# Patient Record
Sex: FEMALE | Race: OTHER | HISPANIC OR LATINO | URBAN - METROPOLITAN AREA
[De-identification: names, ages, dates, MRNs, and addresses within clinical notes are randomized per-mention and may not be internally consistent; named-entity substitution may affect disease eponyms.]

---

## 2019-06-08 ENCOUNTER — EMERGENCY (EMERGENCY)
Facility: HOSPITAL | Age: 34
LOS: 1 days | Discharge: HOME | End: 2019-06-08
Attending: EMERGENCY MEDICINE | Admitting: EMERGENCY MEDICINE
Payer: SELF-PAY

## 2019-06-08 VITALS
SYSTOLIC BLOOD PRESSURE: 113 MMHG | TEMPERATURE: 98 F | DIASTOLIC BLOOD PRESSURE: 72 MMHG | WEIGHT: 125 LBS | RESPIRATION RATE: 19 BRPM | HEIGHT: 65 IN | HEART RATE: 60 BPM | OXYGEN SATURATION: 100 %

## 2019-06-08 DIAGNOSIS — M79.89 OTHER SPECIFIED SOFT TISSUE DISORDERS: ICD-10-CM

## 2019-06-08 DIAGNOSIS — M79.671 PAIN IN RIGHT FOOT: ICD-10-CM

## 2019-06-08 PROCEDURE — 99283 EMERGENCY DEPT VISIT LOW MDM: CPT

## 2019-06-08 PROCEDURE — 99283 EMERGENCY DEPT VISIT LOW MDM: CPT | Mod: 25

## 2019-06-08 PROCEDURE — 73630 X-RAY EXAM OF FOOT: CPT | Mod: 26,RT

## 2019-06-08 PROCEDURE — 73630 X-RAY EXAM OF FOOT: CPT

## 2019-06-08 PROCEDURE — 99053 MED SERV 10PM-8AM 24 HR FAC: CPT

## 2019-06-08 NOTE — ED ADULT NURSE NOTE - OBJECTIVE STATEMENT
s/p trip and fall while wearing heels last night. bruising noted to top of right foot. no swelling noted to right lower extremity. denies any numbness/tingling to extremity.

## 2019-06-08 NOTE — ED ADULT NURSE NOTE - NS ED PATIENT SAFETY CONCERN
False Labor    If your pregnancy is at 37 weeks or longer and you are having contractions that are not true labor, you are having false labor. This means that it is not time yet to give birth to your baby.  True labor contractions can start unevenly but soon take on a regular pattern. As time goes on, the contractions will get stronger. Also, the intervals between the contractions will get shorter. Even at the onset, these contractions last at least 30 seconds and may increase to a minute. True labor contractions often start in the back and then move to the front.  False labor contractions can be strong, frequent, and painful, but there is no regular pattern. The intensity can vary from strong to mild to strong again. False labor contractions are most often felt in the front. While true labor contractions don’t stop no matter what you are doing, false labor contractions may stop on their own or when you rest or move around.  False labor contractions might make you feel anxious or lose sleep. However, they don’t mean that you are sick or that anything is wrong with your baby. You don’t need to take any medicine for false labor.  Sometimes, it may be too hard to tell false labor from true labor. In such cases, you may need to have a vaginal exam. This allows your healthcare provider to check for changes in the cervix that only occur with true labor.  Home care  · It may help to drink plenty of water and take warm baths. Do what you can ahead of time to prepare for giving birth so you’ll have less to worry about later.  · Keep a record of your contractions. Write down what time each one starts and how long it lasts. A stopwatch is helpful. Look for the pattern of regularly spaced out contractions with a gradual increase in the time each one lasts.  · Don’t be embarrassed about going to the hospital with a “false alarm.” Think of it as good practice for the real thing.    Follow-up care  Follow up with your healthcare  provider, or as advised. If you are very worried, confused, can’t eat or sleep, or have questions about your health or pregnancy, schedule an appointment with your provider.  When to seek medical advice  Call your healthcare provider right away if any of these occur:  · You are fewer than 37 weeks along in your pregnancy and you’re having contractions.  · You have contractions that are regular, getting longer, stronger, and closer together.  · Your water breaks.  · You have vaginal bleeding.  · You feel a decrease in your baby’s movement or any other unusual changes.   · You’re not sure if you are having false or true labor.     © 1439-4073 [employer]. [address] [place] [address], [place] [address]. All rights reserved. This information is not intended as a substitute for professional medical care. Always follow your healthcare professional's instructions.            No

## 2019-06-09 NOTE — ED PROVIDER NOTE - MUSCULOSKELETAL, MLM
swelling over lateral part of R foot, no other bony tenderness, no ankle pain, Spine appears normal, range of motion is not limited, no muscle or joint tenderness

## 2019-06-09 NOTE — ED PROVIDER NOTE - NSFOLLOWUPCLINICS_GEN_ALL_ED_FT
[facility]  Family Medicine  [facility]. [address]  [place], [place] [facility]  Phone: [phone]  Fax:   Follow Up Time:

## 2019-06-09 NOTE — ED PROVIDER NOTE - NSFOLLOWUPINSTRUCTIONS_ED_ALL_ED_FT
Musculoskeletal Pain  Musculoskeletal pain refers to aches and pains in your bones, joints, muscles, and the tissues that surround them. This pain can occur in any part of the body. It can last for a short time (acute) or a long time (chronic).    A physical exam, lab tests, and imaging studies may be done to find the cause of your musculoskeletal pain.    Follow these instructions at home:  Image   Lifestyle     Try to control or lower your stress levels. Stress increases muscle tension and can worsen musculoskeletal pain. It is important to recognize when you are anxious or stressed and learn ways to manage it. This may include:  Meditation or yoga.  Cognitive or behavioral therapy.  Acupuncture or massage therapy.  You may continue all activities unless the activities cause more pain. When the pain gets better, slowly resume your normal activities. Gradually increase the intensity and duration of your activities or exercise.  Managing pain, stiffness, and swelling     Take over-the-counter and prescription medicines only as told by your health care provider.  When your pain is severe, bed rest may be helpful. Lie or sit in any position that is comfortable, but get out of bed and walk around at least every couple of hours.  If directed, apply heat to the affected area as often as told by your health care provider. Use the heat source that your health care provider recommends, such as a moist heat pack or a heating pad.  Place a towel between your skin and the heat source.  Leave the heat on for 20–30 minutes.  Remove the heat if your skin turns bright red. This is especially important if you are unable to feel pain, heat, or cold. You may have a greater risk of getting burned.  If directed, put ice on the painful area.  Put ice in a plastic bag.  Place a towel between your skin and the bag.  Leave the ice on for 20 minutes, 2–3 times a day.  General instructions     Your health care provider may recommend that you see a physical therapist. This person can help you come up with a safe exercise program. Do any exercises as told by your physical therapist.  Keep all follow-up visits, including any physical therapy visits, as told by your health care providers. This is important.  Contact a health care provider if:  Your pain gets worse.  Medicines do not help ease your pain.  You cannot use the part of your body that hurts, such as your arm, leg, or neck.  You have trouble sleeping.  You have trouble doing your normal activities.  Get help right away if:  You have a new injury and your pain is worse or different.  You feel numb or you have tingling in the painful area.  Summary  Musculoskeletal pain refers to aches and pains in your bones, joints, muscles, and the tissues that surround them.  This pain can occur in any part of the body.  Your health care provider may recommend that you see a physical therapist. This person can help you come up with a safe exercise program. Do any exercises as told by your physical therapist.  Lower your stress level. Stress can worsen musculoskeletal pain. Ways to lower stress may include meditation, yoga, cognitive or behavioral therapy, acupuncture, and massage therapy.  This information is not intended to replace advice given to you by your health care provider. Make sure you discuss any questions you have with your health care provider.    Document Released: 12/18/2006 Document Revised: 01/17/2018 Document Reviewed: 01/17/2018  [employer] Patient Education © 2019 [employer]

## 2019-06-09 NOTE — ED PROVIDER NOTE - OBJECTIVE STATEMENT
Pt previously healthy with complaints of R foot pain after fall yesterday, tripped while wearing heels, no other injury, pain mainly to lateral part of R foot, no diff ambulating, numbness, paresthesias. Pt has not tried anything for symptoms, no other aggravating or relieving factors.

## 2023-05-13 NOTE — ED PROVIDER NOTE - NS ED NOTE AC HIGH RISK COUNTRIES
No no dizziness/no headache/no loss of consciousness/no neck tenderness/no difficulty bearing weight
